# Patient Record
Sex: MALE | Race: WHITE
[De-identification: names, ages, dates, MRNs, and addresses within clinical notes are randomized per-mention and may not be internally consistent; named-entity substitution may affect disease eponyms.]

---

## 2017-09-29 ENCOUNTER — HOSPITAL ENCOUNTER (OUTPATIENT)
Dept: HOSPITAL 84 - D.CATH | Age: 52
Discharge: HOME | End: 2017-09-29
Attending: INTERNAL MEDICINE
Payer: COMMERCIAL

## 2017-09-29 VITALS — DIASTOLIC BLOOD PRESSURE: 76 MMHG | SYSTOLIC BLOOD PRESSURE: 130 MMHG

## 2017-09-29 VITALS — BODY MASS INDEX: 35.9 KG/M2

## 2017-09-29 DIAGNOSIS — Z01.812: ICD-10-CM

## 2017-09-29 DIAGNOSIS — I25.119: Primary | ICD-10-CM

## 2017-09-29 DIAGNOSIS — I10: ICD-10-CM

## 2017-09-29 LAB
ANION GAP SERPL CALC-SCNC: 14.9 MMOL/L (ref 8–16)
BASOPHILS NFR BLD AUTO: 0.2 % (ref 0–2)
BUN SERPL-MCNC: 20 MG/DL (ref 7–18)
CALCIUM SERPL-MCNC: 9.4 MG/DL (ref 8.5–10.1)
CHLORIDE SERPL-SCNC: 100 MMOL/L (ref 98–107)
CO2 SERPL-SCNC: 26.8 MMOL/L (ref 21–32)
CREAT SERPL-MCNC: 1.3 MG/DL (ref 0.6–1.3)
EOSINOPHIL NFR BLD: 1 % (ref 0–7)
ERYTHROCYTE [DISTWIDTH] IN BLOOD BY AUTOMATED COUNT: 13 % (ref 11.5–14.5)
GLUCOSE SERPL-MCNC: 170 MG/DL (ref 74–106)
HCT VFR BLD CALC: 42.3 % (ref 42–54)
HGB BLD-MCNC: 14.9 G/DL (ref 13.5–17.5)
IMM GRANULOCYTES NFR BLD: 1.4 % (ref 0–5)
LYMPHOCYTES NFR BLD AUTO: 29.8 % (ref 15–50)
MCH RBC QN AUTO: 32.3 PG (ref 26–34)
MCHC RBC AUTO-ENTMCNC: 35.2 G/DL (ref 31–37)
MCV RBC: 91.8 FL (ref 80–100)
MONOCYTES NFR BLD: 6.9 % (ref 2–11)
NEUTROPHILS NFR BLD AUTO: 60.7 % (ref 40–80)
OSMOLALITY SERPL CALC.SUM OF ELEC: 282 MOSM/KG (ref 275–300)
PLATELET # BLD: 158 10X3/UL (ref 130–400)
PMV BLD AUTO: 9.6 FL (ref 7.4–10.4)
POTASSIUM SERPL-SCNC: 3.7 MMOL/L (ref 3.5–5.1)
RBC # BLD AUTO: 4.61 10X6/UL (ref 4.2–6.1)
SODIUM SERPL-SCNC: 138 MMOL/L (ref 136–145)
WBC # BLD AUTO: 10.6 10X3/UL (ref 4.8–10.8)

## 2017-09-29 NOTE — NUR
TOLERATING FLUIDS WITH NAUSEA DENIED. VSS WITH NO DISTRESS NOTED. 6
FR EXOSEAL R/GROIN CDI NO BLEEDING NO HEMATOMA NOTED

## 2017-09-29 NOTE — NUR
R/GROIN CDI NO BLEEDING NO HEMATOMA NOTED. PIV REMOVED WITH DRESSING
APPLIED PATIENT UP TO GET DRESSED FOR DISCHARGE HOME CHEST PAIN
DENIED

## 2017-09-29 NOTE — NUR
0900 RECIEVED TO ROOM VIA STRETCHER FROM CATH LAB WITH 6 FR EXOSEAL
R/GROIN CDI NO BLEEDING NO HEMATOMA NOTED. REPORTS OF ONE STENT TO
THE LAD AND BALLON TO THE DIAG. HR 80 CHEST PAIN DENIED. O2 AT 2
LITERS NASAL WITH SAT 98% /78. INSTRUCTED PATIENT TO KEEP HEAD
FLAT ON PILLOW WITH RLE STRAIGHT

## 2017-09-29 NOTE — NUR
REPOSITIONED TO SITTING WITH HOB UP 30 DEGREES AT PATIENT
DEMAND.REFUSING TO OBEY ORDERS WANTS BP OFF AND PULSE OX OFF.
EDUCATED PATIENT OF NEED TO MONITOR VITAL SIGNS AND RISK FOR BLEEDING
TO GROIN. PATIENT STILL REFUSED HOB RAISED 30 DEGREES R/GROIN CDI NO
BLEEDING NO HEMATOMA NOTED.

## 2017-09-29 NOTE — HEMODYNAMI
PATIENT:MICK SAMS                            MEDICAL RECORD: [id number]
: 61                                            LOCATION:D.CAT          
ACCT# [id number]                                       ADMISSION DATE: 17
 
 
 Generatedon:20178:51
Patient name: MICK SAMS Patient #: [id number] Visit #: [id number] SSN: 
: 1965
Date of study: 2017
Page: Of
Hemodynamic Procedure Report
****************************
Patient Data
Patient Demographics
Procedure consent was obtained
First Name: MICK         Gender: Male
Last Name: LATRELL         : 1965
[facility] Initial: LULA        Age: 52 year(s)
Patient #: [id number]       Race: 
Visit #: [id number]
Accession #:
[id number]
Additional ID: [id number]
Contact details
Address: [address] [place] [address]  Phone: [phone]
State: [place]
City: [place]
Zip code: [address]
Past Medical History
Allergies
Allergen        Reaction        Date         Comments
Reported
Other allergy                   2015    SHRIMP (hives,
swelling); NO
allergy to Iodine.
Other allergy                   2016   Shellfish
Shellfish                       2017
Admission
Admission Data
Admission Date: 2017   Admission Time: 5:54
Procedure
Procedure Types
Cath Procedure
Diagnostic Procedure
[facility] w/Coronaries
PCI Procedure
Coronary Stent Initial
PTCA Initial
Miscellaneous Procedures
Moderate Sedation up to 30 minutes
Procedure Description
Procedure Date
Procedure Date: 2017
Procedure Start Time: 8:32
Procedure End Time: 8:50
Procedure Staff
Name                            Function
Durga Fu MD                Performing Physician
 
Yanna Miller RT               Scrub
Angel [place] RT                  Scrub
Ezio Conner RN              Nurse
Lidia Espinosa RT             Monitor
Boubacar Mancuso RN                Circulator
Procedure Data
Cath Procedure
Fluoroscopy
Diagnostic fluoroscopy      Total fluoroscopy Time: 5.6
time: 5.6 min               min
Diagnostic fluoroscopy      Total fluoroscopy dose: 764
dose: 764 mGy               mGy
Contrast Material
Contrast Material Type                       Amount (ml)
Isovue 300                                   89
Entry Location
Entry     Primary  Successful  Side  Size  Upsize Upsize Entry    Closure Succes
sful  Closure
Location                             (Fr)  1 (Fr) 2 (Fr) Remarks  Device        
      Remarks
Femoral                        Right 5 Fr  6 Fr                   Exoseal
artery                                     Short
Estimated blood loss: 10 ml
Diagnostic catheters
Device Type               Used For           End Catheter
Placement
Cordis 5Fr Pigtail        LV Angiography
Catheter (MP)
Cordis 5Fr JL 4.0         Left Coronary
Catheter (MP)             Angiography
Cordis 5Fr 3DRC Catheter  Right Coronary
(MP)                      Angiography
Procedure Complications
No complications
Procedure Medications
Medication           Administration Route Dosage
0.9% NaCl            I.V.                 100 ml/hr
Oxygen               NC                   2 l/min
Lidocaine 2%         added to field       20
Heparin Flush Bag    added to field       2 bags
(1000units/500ml NS)
Versed               I.V.                 1 mg
Fentanyl             I.V.                 50 mcg
Fentanyl             I.V.                 50 mcg
Versed               I.V.                 2 mg
Heparin Bolus        I.V.                 4000 units
Versed               I.V.                 1 mg
Hemodynamics
Rest
Heart Rate: 68 (bpm)
Snapshots
Pre Cath      Intra         NCS           Post Cath
Vital Signs
Time    Heart  Resp   SPO2 etCO2   LR5urlg NIBP (mmHg) Rhythm  Pain    Sedation
Rate   (ipm)  (%)  (mmHg)  (mmHg)                      Status  Level
(bpm)
8:10:40 66     20     98   0       0       134/69(99)  NSR     0 (11)  10(A)
, No
pain
8:15:28 68     22     97   0       0       122/67(92)  NSR     0 (11)  10(A)
 
, No
pain
8:20:13 68     15     98   0       0       120/76(90)  NSR     0 (11)  10(A)
, No
pain
8:24:58 72     16     96   0       0       120/74(92)  NSR     0 (11)  10(A)
, No
pain
8:29:42 87     19     96   0       0       121/84(104) NSR     0 (11)  10(A)
, No
pain
8:34:27 72     17     97   0       0       120/74(95)  NSR     0 (11)  9(A)
, No
pain
8:39:14 81     15     97   0       0       122/71(99)  NSR     0 (11)  9(A)
, No
pain
8:44:01 83     15     94   0       0       122/71(95)  NSR     0 (11)  10(A)
, No
pain
8:48:48 78     17     96   0       0       119/70(102) NSR     0 (11)  10(A)
, No
pain
Medications
Time    Medication       Route  Dose  Verified Delivered Reason          Notes  
Effectiveness
by       by
8:07:53 0.9% NaCl        I.V.   100   Ezio  Ezio   Per physician
ml/hr Ubaldo Conner RN       RN
8:08:12 Oxygen           NC     2     Ezio  Ezio   Per physician
l/min Ubaldo Conner
RN       RN
8:08:25 Lidocaine 2%     added  20ml  Ezio  Ezio   for local
to     vial  Ubaldo Conner   anesthetic
field        RN       RN
8:08:46 Heparin Flush    added  2     Ezio  Ezio   used for
Bag              to     bags  Ubaldo Conner   procedure
(1000units/500ml field        RN       RN
NS)
8:28:42 Versed           I.V.   1 mg  Ezio  Ezio   for sedation
Ubaldo Conner
RN       RN
8:29:07 Fentanyl         I.V.   50    Ezio  Ezio   for sedation
mcg   Ubaldo Conner
RN       RN
8:33:21 Fentanyl         I.V.   50    Ezio  Ezio   for sedation
mcg   Ubaldo Conner
RN       RN
8:33:36 Versed           I.V.   2 mg  Ezio  Ezio   for sedation
Ubaldo Conner
RN       RN
8:39:17 Heparin Bolus    I.V.   4000  Ezio  Ezio   for
units Ubaldo Conner   anticoagulation
RN       RN
8:47:37 Versed           I.V.   1 mg  Ezio  Ezio   for sedation
Ubaldo Conner RN       RN
Procedure Log
Time     Note
 
8:00:30  Boubacar Mancuso RN sent for patient. Start room use.
8:00:31  Time tracking: Regular hours
8:00:35  Plan of Care:Hemodynamics will remain stable., Cardiac
rhythm will remain stable., Comfort level will be
maintained., Respiratory function will remain
adequate., Patient/ family verbilizes understanding of
procedure., Procedure tolerated without complication.,
Recovers from procedure without complications..
8:06:06  Patient received from Pre/Post Procedure Room to CCL 1
Alert and oriented. Tansferred to table in Supine
position.
8:06:07  Warm blankets applied, and jeison hugger turned on for
patient comfort.
8:06:08  Correct patient and procedure confirmed by team.
8:06:09  Signed procedure consent form obtained from patient.
8:06:10  ECG and BP/O2 sat monitors applied to patient.
8:06:11  Full Disclosure recording started
8:07:53  0.9% NaCl 100 ml/hr I.V. was administered by Ezio Conner RN; Per physician;
8:08:12  Oxygen 2 l/min NC was administered by Ezio Conner RN; Per physician;
8:08:25  Lidocaine 2% 20ml vial added to field was administered
by Ezio Conner RN; for local anesthetic;
8:08:46  Heparin Flush Bag (1000units/500ml NS) 2 bags added to
field was administered by Ezio Conner RN; used for
procedure;
8:09:40  Vital chart was started
8:12:29  Baseline sample Acquired.
8:12:32  Rhythm: sinus rhythm
8:12:43  H&P Date Dictated: 2017 Within 30 days and on
chart., H&P Addendum completed by physician on day of
procedure. (MUST COMPLETE FOR ALL OUTPATIENTS).
8:12:44  Pre-procedure instructions explained to patient.
8:12:45  Pre-op teaching completed and patient verbalized
understanding.
8:12:47  Family in patients room.
8:12:48  Patient NPO since Midnight.
8:12:58  Patient allergic to Shellfish
8:13:01  Is the patient allergic to Iodine/contrast media? No.
8:13:03  Was the patient premedicated? No
8:13:05  Is patient on blood thinner?Yes
8:13:07  **ACC** The patient was administered the following
blood thiners within the last 24 hours:
**ACC**Aspirin, **ACC**Plavix
8:13:08  Patient diabetic? No.
8:13:16  Previous problem with sedation/anesthesia? No ?
8:13:17  Snore? Yes
8:13:18  Sleep apnea? Yes
8:13:19  Deviated septum? No
8:13:20  Opens mouth fully? Yes
8:13:21  Sticks out tongue? Yes
8:13:26  Airway obstruction? No ?
8:13:59  Dentures? No ?
8:14:02  Pre procedure: right dorsailis pedis pulse 2+ Normal;
easily identifiable; not easily obliterated
8:14:04  Patient pain scale 0/10 ?.
8:14:11  IV patent on arrival in left hand with 0.9% NaCl at
[facility].
8:14:19  Lab results completed and on chart.
8:14:21  Right groin area was prepped with chlora-prep and
 
draped in sterile fashion
8:14:22  Alarms reviewed by R. N.
8:14:23  Sharps counted by scrub and verified by R.N.
8:14:27  Use device set Femoral Dx
8:14:28  Acist Syringe opened to sterile field.
8:14:28  Bag Decanter opened to sterile field.
8:14:29  Medline Cath Pack opened to sterile field.
8:14:29  Terumo 5Fr Doyle Sheath opened to sterile field.
8:14:29  St Jaylan 260cm J .035 wire opened to sterile field.
8:14:30  Acist Hand Control opened to sterile field.
8:14:31  Acist Manifold opened to sterile field.
8:14:31  Diagnostic Infinity 5Fr Multipack catheter opened to
sterile field.
8:14:32  Tegaderm 4 x 4 opened to sterile field.
8:17:41  Physician paged
8:28:09  Final Timeout: patient, procedure, and site verified
with staff and physician. All members of the team are
in agreement.
8:28:11  Right groin site verified by team.
8:28:14  Physical assessment completed. ASA score P 2 - A
patient with mild systemic disease as per Durga Fu MD.
8:28:17  Sedation plan: IV Moderate Sedation Versed, Fentanyl
8:28:42  Versed 1 mg I.V. was administered by Ezio Conner RN; for sedation;
8:29:07  Fentanyl 50 mcg I.V. was administered by Ezio Conner RN; for sedation;
8:29:39  Zero performed for pressure channel P1
8:30:21  Zero performed for pressure channel P1
8:30:25  Procedure started.
8:32:06  Local anesthetic to right femoral artery with
Lidocaine 2% by Durga Fu MD.**INITIAL ACCESS
ONLY**
8:32:51  A 5 Fr sheath was inserted into the Right Femoral
artery
8:32:59  A Cordis 5Fr Pigtail Catheter (MP) was advanced over
the wire and used for LV Angiography.
8:33:21  Fentanyl 50 mcg I.V. was administered by Ezio Conner RN; for sedation;
8:33:30  LV gram done using RIVERA
8:33:36  Versed 2 mg I.V. was administered by Ezio Conner
RN; for sedation;
8:33:37  Injector settings: Ml/sec: 10, Volume: 20,
8:33:39  Catheter removed.
8:33:46  A Cordis 5Fr JL 4.0 Catheter (MP) was advanced over
the wire and used for Left Coronary Angiography.
8:34:52  Terumo 6Fr Doyle Sheath opened to sterile field.
8:34:52  Presley Whisper J 300cm 0.014 guide wire opened to
sterile field.
8:34:53  Merit BasixCompak Inflation Kit opened to sterile
field.
8:34:57  Catheter removed.
8:35:02  A Cordis 5Fr 3DRC Catheter (MP) was advanced over the
wire and used for Right Coronary Angiography.
8:35:35  Catheter removed.
8:35:43  Cordis 6FR XBLAD 3.5 guide catheter opened to sterile
field.
8:35:51  Sheath upsized to a 6 Fr Short.
8:38:45  6 Fr XBLAD 3.5 guide catheter was inserted over the
wire
 
8:39:17  Heparin Bolus 4000 units I.V. was administered by
Ezio Conner RN; for anticoagulation;
8:39:31  Whisper wire advanced.
8:40:57  Inflation Number: 1 A Promus Premier OTW 3.5 x 20
stent was prepped and advanced across the Mid LAD. The
stent was deployed at 17 VALENTIN for 0:05 (min:sec).
8:42:25  Wire redirected to Diag.
8:43:00  Stent catheter was removed intact over wire.
8:45:29  Inflation number: 1 A Euphora 1.5 x 12 balloon was
prepped and advanced across the 1st Diag, then
inflated to 17 VALENTIN for 0:09 (min:sec).
8:45:40  Inflation number: 2 The Euphora 1.5 x 12 balloon was
reinflated across the 1st Diag, to 21 VALENTIN for 0:06
(min:sec).
8:46:16  Balloon removed over the wire.
8:46:16  Wire removed.
8:46:17  Guide catheter removed.
8:46:24  Sheath removed intact; hemostasis achieved with
Exoseal to the Right Femoral artery.
8:46:31  Cordis 6Fr Exoseal opened to sterile field.
8:46:33  Procedure ended.(Physican Out)
8:47:37  Versed 1 mg I.V. was administered by Ezio Conner RN; for sedation;
8:47:47  Fluoroscopy time 05.60 minutes.
8:47:51  Fluoroscopy dose: 764 mGy
8:47:51  Flurop Dose total: 764
8:47:55  Contrast amount:Isovue 300 89ml.
8:47:57  Sharps counted by scrub and verified by R.N.
8:47:59  Insertion/operative site no bleeding no hematoma.
8:48:02  Post-op/insertion site Right Femoral artery dressed
using a 4 x 4 and Tegaderm.
8:48:05  Post right femoral artery:stable, clean and dry
8:48:07  Post Procedure Pulses reassessed and unchanged
8:48:10  Post-procedure physical assessment completed. ASA
score P 2 - A patient with mild systemic disease as
per Durga Fu MD.
8:48:12  Post procedure rhythm: unchanged.
8:48:18  Estimated blood loss: 10 ml
8:48:20  Post procedure instruction explained to
patient.Patient verbalizes understanding.
8:48:20  Patient needs reinforcement of post procedure
teaching.
8:48:33  Procedure type changed to Cath procedure, Diagnostic
procedure, LHC, LHC w/Coronaries, PCI procedure,
Coronary Stent Initial, PTCA Initial, Miscellaneous
Procedures, Moderate Sedation up to 30 minutes
8:49:55  Procedure and supply charges have been captured,
reviewed, submitted and are correct.
8:50:00  Procedure Complication : No complications
8:50:02  See physician's report for complete and final results.
8:50:30  Vital chart was stopped
8:50:31  Report given to Pre/Post Procedure Room.
8:50:35  Patient transfered to Pre/Post Procedure Room with
Stretcher.
8:50:42  Procedure ended.
8:50:42  Full Disclosure recording stopped
8:50:45  End room use (Document Last)
Intervention Summary
Intervention Notes
Time    ActionType  Lesion and  Equipment Action#  Pressure  Duration
 
Attributes  Used
8:40:57 Place stent Mid LAD     Promus    1        17        00:05
Premier
OTW 3.5 x
20 stent
8:45:29 Inflate     1st Diag    Euphora   1        17        00:09
balloon                 1.5 x 12
balloon
8:45:40 Reinflate   1st Diag    Euphora   2        21        00:06
balloon                 1.5 x 12
balloon
Device Usage
Item Name   Manufacture  Quantity  Catalog Number  Hospital Part    Current Mini
mal Lot# /
Charge   Number  Stock   Stock   Serial#
Code
Acist       Acist        1         [id number]           505244   014152  121596  20
[employer]
Bag         Microtek     1         S           464961   39238   597881  5
[employer]
Medline     Cardinal     1         [id number]       658509   88327   384644  5
Cath Pack   Health
Terumo 5Fr  Terumo       1         [id number]          101910   978263  401004  40
Doyle
Sheath
St Jaylan     St Jaylan      1         [id number]          078605   841053  997741  30
260cm J
.035 wire
Acist Hand  Acist        1         48239           497892   286084  059154  5
Asthmatx     Medical
Systems Inc
Acist       Acist        1         92435           104342   639487  172399  5
Yohobuy    Medical
Systems Inc
Diagnostic  Cardinal     1         [id number]          141581   13251   964513  30
Infinity    Health
5Fr
Multipack
catheter
Tegaderm 4  3M           1         [id number]           851455   244300  255206  5
x 4
Cordis 5Fr  Cardinal     1                                          241008  5
Pigtail     Health
Catheter
(MP)
Cordis 5Fr  Cardinal     1                                          099917  5
JL 4.0      Health
Catheter
(MP)
Terumo 6Fr  Terumo       1         [id number]          487865   036497  148633  40
Doyle
Sheath
[facility]       1         [id number]       809367   386089  720331  5
Whisper J   Vascular
300cm 0.014
guide wire
Merit       Merit        1         [id number]          445576   897084  819903  15
[employer] Medical
Inflation
 
Kit
Cordis 5Fr  Cardinal     1                                          [id number]  5
3DRC        Health
Catheter
(MP)
Cordis 6FR  Cardinal     1         [id number]        893626   206264  965618  10
XBLAD 3.5   Health
guide
catheter
Promus      Stokes       1         [id number]  513858           852814  5   
    [id number]
Premier [employer]
3.5 x 20
stent
Euphora 1.5 Medtronic    1         [id number]        703031   913621  507957  5   
    [id number]
x 12
balloon
Cordis 6Fr  Cardinal     1                    013001   824114  913397  10
[employer]Vox     Health
Signature Audit [place]
Stage           Time        Signature      Unsigned
Intra-Procedure 2017   Lidia
8:50:57 AM  Counts RT(R)
Signatures
Monitor : Lidia     Signature :
Counts RT               ______________________________
Date : ______________ Time :
______________
 
 
 
 
 
 
 
 
 
 
 
 
 
 
 
 
 
 
 
 
 
 
 
 
 
 
[facility]                                          
[address] [place] [address]

## 2017-09-29 NOTE — NUR
VERBAL AND WRITTEN DISCHARGE GONE OVER WITH PATIENT AND FRIENDS.
R/GROIN REMAINS CDI NO BLEEDING NO HEMATOMA NOTED. CHEST PAIN IS
DENIED. LEFT VIA WC TO PARKING FOR TRANSPORT HOME NO DISRESS

## 2017-09-29 NOTE — OP
PATIENT NAME:  MICK SAMS                     MEDICAL RECORD: [id number]
:[birth year]                                             LOCATION:D.CAT          
                                                         ADMISSION DATE:        
SURGEON:  SORIN COUCH MD             
 
 
DATE OF OPERATION:  2017
 
PROCEDURES:
1.  PTCA stent LAD.
2.  PTCA LAD diagonal.
3.  Left heart catheterization.
4.  Selective coronary angiography.
5.  Left ventriculogram.
 
INDICATION:  Angina and coronary artery disease.
 
PROCEDURE IN DETAIL:  After informed consent was obtained and after a detailed
explanation of the risks, benefits as well as alternative therapies, the patient
elected to proceed with angiogram and angioplasty.  The right femoral area was
prepped and draped in normal sterile fashion.  The right femoral artery was
cannulated via modified Seldinger technique with placement of a 6-Botswanan sheath.
 All catheters exchanged through this sheath.
 
FINDINGS:  Left ventriculogram was performed in standard 30-degree RIVERA view,
reveals good cardiac wall motion throughout all segments.  Overall ejection
fraction estimated 60%.
 
SELECTIVE CORONARY ANGIOGRAPHY:
1.  Left main showed no significant angiographic disease.
2.  Left anterior descending has previously placed stent in the mid vessel with
up to 70% to 80% in-stent restenosis.  This is a Resolute stent placed in 2015.
3.  Left circumflex shows moderate irregularities, but no flow-limiting
stenosis.  Previously placed stent is widely patent.
4.  Right coronary has moderate irregularities, but no flow-limiting stenosis.
 
PTCA STENT OF THE LAD:  The in-stent restenosis was addressed with a 3.5 x 20 mm
Promus stent taken to 17 atmospheres.  The diagonal have a plaque shift opposing
impingement and the diagonal, this was ballooned with a 1.5 balloon.  Result was
0% residual throughout.
 
OVERALL IMPRESSION:  Successful percutaneous transluminal coronary angioplasty
stent of the left anterior descending going from 70% to 80% in-stent restenosis
to 0% residual stenosis.
 
TRANSINT:[id number] Voice Confirmation ID: [id number] DOCUMENT ID: [id number]
                                           
                                           SORIN COUCH MD             
 
 
 
Electronically Signed by SORIN COUCH on 17 at 1647
CC:                                                             [id number]
DICTATION DATE: 17 0851     :     17 1244      DEP CLI 
                                                                      [address]
[place] [address]

## 2017-09-29 NOTE — NUR
6 FR EXOSEAL R/GROIN CDI NO BLEEDING NO HEMATOMA NOTED. VSS WITH
CHEST PAIN DENIED. PATIENT VERBALIZED NEED TO URINATE URINAL PROVIDED
WITH PATIENT UPSET. STATED I HAVE TO STAND UP TO URINATE I INSTRUCTED
PATIENT OF  ORDERS FOR BED REST UNTIL 1300. PATIENT ANGRY STATED
THEN I WANT TO GO HOME NOW. NOTIFIED CRISTIANE WITH ASSIST TO ROOM

## 2017-09-29 NOTE — NUR
6 FR EXOSEAL R/GROIN CDI NO BLEEDING NO HEMATOMA NOTED. VSS WITH
CHEST PAIN DENIED. DR COUCH AT BEDSIDE

## 2017-09-29 NOTE — NUR
6 FR EXOSEAL R/GROIN CDI NO BLEEDING NO HEMATOMA NOTED. PATIENT VOIDS
700 CC URINE TO COLLECTION VSS

## 2018-03-07 ENCOUNTER — HOSPITAL ENCOUNTER (OUTPATIENT)
Dept: HOSPITAL 84 - D.CATH | Age: 53
Discharge: HOME | End: 2018-03-07
Attending: INTERNAL MEDICINE
Payer: COMMERCIAL

## 2018-03-07 VITALS — DIASTOLIC BLOOD PRESSURE: 73 MMHG | SYSTOLIC BLOOD PRESSURE: 120 MMHG

## 2018-03-07 VITALS
BODY MASS INDEX: 34 KG/M2 | HEIGHT: 70 IN | HEIGHT: 70 IN | BODY MASS INDEX: 34 KG/M2 | WEIGHT: 237.5 LBS | WEIGHT: 237.5 LBS

## 2018-03-07 DIAGNOSIS — I25.119: Primary | ICD-10-CM

## 2018-03-07 DIAGNOSIS — I10: ICD-10-CM

## 2018-03-07 DIAGNOSIS — Z01.812: ICD-10-CM

## 2018-03-07 LAB
ANION GAP SERPL CALC-SCNC: 15.4 MMOL/L (ref 8–16)
BASOPHILS NFR BLD AUTO: 0.5 % (ref 0–2)
BUN SERPL-MCNC: 17 MG/DL (ref 7–18)
CALCIUM SERPL-MCNC: 9.5 MG/DL (ref 8.5–10.1)
CHLORIDE SERPL-SCNC: 100 MMOL/L (ref 98–107)
CO2 SERPL-SCNC: 24.5 MMOL/L (ref 21–32)
CREAT SERPL-MCNC: 1.2 MG/DL (ref 0.6–1.3)
EOSINOPHIL NFR BLD: 3.8 % (ref 0–7)
ERYTHROCYTE [DISTWIDTH] IN BLOOD BY AUTOMATED COUNT: 12.8 % (ref 11.5–14.5)
GLUCOSE SERPL-MCNC: 298 MG/DL (ref 74–106)
HCT VFR BLD CALC: 42.8 % (ref 42–54)
HGB BLD-MCNC: 15.1 G/DL (ref 13.5–17.5)
IMM GRANULOCYTES NFR BLD: 0.5 % (ref 0–5)
LYMPHOCYTES NFR BLD AUTO: 30.9 % (ref 15–50)
MCH RBC QN AUTO: 32 PG (ref 26–34)
MCHC RBC AUTO-ENTMCNC: 35.3 G/DL (ref 31–37)
MCV RBC: 90.7 FL (ref 80–100)
MONOCYTES NFR BLD: 6.5 % (ref 2–11)
NEUTROPHILS NFR BLD AUTO: 57.8 % (ref 40–80)
OSMOLALITY SERPL CALC.SUM OF ELEC: 284 MOSM/KG (ref 275–300)
PLATELET # BLD: 123 10X3/UL (ref 130–400)
PMV BLD AUTO: 9.9 FL (ref 7.4–10.4)
POTASSIUM SERPL-SCNC: 3.9 MMOL/L (ref 3.5–5.1)
RBC # BLD AUTO: 4.72 10X6/UL (ref 4.2–6.1)
SODIUM SERPL-SCNC: 136 MMOL/L (ref 136–145)
WBC # BLD AUTO: 5.5 10X3/UL (ref 4.8–10.8)

## 2018-05-25 ENCOUNTER — HOSPITAL ENCOUNTER (OUTPATIENT)
Dept: HOSPITAL 84 - D.CATH | Age: 53
Discharge: HOME | End: 2018-05-25
Attending: INTERNAL MEDICINE
Payer: COMMERCIAL

## 2018-05-25 VITALS — SYSTOLIC BLOOD PRESSURE: 110 MMHG | DIASTOLIC BLOOD PRESSURE: 75 MMHG

## 2018-05-25 VITALS — HEIGHT: 70 IN | BODY MASS INDEX: 33.86 KG/M2 | WEIGHT: 236.5 LBS

## 2018-05-25 DIAGNOSIS — I25.119: Primary | ICD-10-CM

## 2018-05-25 DIAGNOSIS — Z01.812: ICD-10-CM

## 2018-05-25 LAB
ANION GAP SERPL CALC-SCNC: 12.3 MMOL/L (ref 8–16)
BASOPHILS NFR BLD AUTO: 0.4 % (ref 0–2)
BUN SERPL-MCNC: 14 MG/DL (ref 7–18)
CALCIUM SERPL-MCNC: 9.6 MG/DL (ref 8.5–10.1)
CHLORIDE SERPL-SCNC: 103 MMOL/L (ref 98–107)
CO2 SERPL-SCNC: 27.9 MMOL/L (ref 21–32)
CREAT SERPL-MCNC: 1.2 MG/DL (ref 0.6–1.3)
EOSINOPHIL NFR BLD: 4.4 % (ref 0–7)
ERYTHROCYTE [DISTWIDTH] IN BLOOD BY AUTOMATED COUNT: 13.1 % (ref 11.5–14.5)
GLUCOSE SERPL-MCNC: 274 MG/DL (ref 74–106)
HCT VFR BLD CALC: 41.6 % (ref 42–54)
HGB BLD-MCNC: 14.6 G/DL (ref 13.5–17.5)
IMM GRANULOCYTES NFR BLD: 0.4 % (ref 0–5)
LYMPHOCYTES NFR BLD AUTO: 27.9 % (ref 15–50)
MCH RBC QN AUTO: 32.4 PG (ref 26–34)
MCHC RBC AUTO-ENTMCNC: 35.1 G/DL (ref 31–37)
MCV RBC: 92.4 FL (ref 80–100)
MONOCYTES NFR BLD: 7.2 % (ref 2–11)
NEUTROPHILS NFR BLD AUTO: 59.7 % (ref 40–80)
OSMOLALITY SERPL CALC.SUM OF ELEC: 288 MOSM/KG (ref 275–300)
PLATELET # BLD: 101 10X3/UL (ref 130–400)
PMV BLD AUTO: 9.6 FL (ref 7.4–10.4)
POTASSIUM SERPL-SCNC: 4.2 MMOL/L (ref 3.5–5.1)
RBC # BLD AUTO: 4.5 10X6/UL (ref 4.2–6.1)
SODIUM SERPL-SCNC: 139 MMOL/L (ref 136–145)
WBC # BLD AUTO: 4.7 10X3/UL (ref 4.8–10.8)

## 2018-05-26 ENCOUNTER — HOSPITAL ENCOUNTER (OUTPATIENT)
Dept: HOSPITAL 84 - D.ER | Age: 53
Setting detail: OBSERVATION
LOS: 1 days | Discharge: HOME | End: 2018-05-27
Attending: INTERNAL MEDICINE | Admitting: INTERNAL MEDICINE
Payer: COMMERCIAL

## 2018-05-26 VITALS — SYSTOLIC BLOOD PRESSURE: 128 MMHG | DIASTOLIC BLOOD PRESSURE: 74 MMHG

## 2018-05-26 VITALS — BODY MASS INDEX: 35.03 KG/M2 | BODY MASS INDEX: 35.03 KG/M2 | HEIGHT: 69 IN | WEIGHT: 236.5 LBS

## 2018-05-26 DIAGNOSIS — I25.10: ICD-10-CM

## 2018-05-26 DIAGNOSIS — R07.9: Primary | ICD-10-CM

## 2018-05-26 DIAGNOSIS — Z95.5: ICD-10-CM

## 2018-05-26 DIAGNOSIS — I10: ICD-10-CM

## 2018-05-26 LAB
ALBUMIN SERPL-MCNC: 4 G/DL (ref 3.4–5)
ALP SERPL-CCNC: 75 U/L (ref 46–116)
ALT SERPL-CCNC: 86 U/L (ref 10–68)
ANION GAP SERPL CALC-SCNC: 14.6 MMOL/L (ref 8–16)
BASOPHILS NFR BLD AUTO: 0.1 % (ref 0–2)
BILIRUB SERPL-MCNC: 0.85 MG/DL (ref 0.2–1.3)
BUN SERPL-MCNC: 26 MG/DL (ref 7–18)
CALCIUM SERPL-MCNC: 9.5 MG/DL (ref 8.5–10.1)
CHLORIDE SERPL-SCNC: 99 MMOL/L (ref 98–107)
CHOLEST/HDLC SERPL: 9.8 RATIO (ref 2.3–4.9)
CK MB SERPL-MCNC: 0.3 U/L (ref 0–3.6)
CK MB SERPL-MCNC: 0.4 U/L (ref 0–3.6)
CK SERPL-CCNC: 139 UL (ref 21–232)
CK SERPL-CCNC: 152 UL (ref 21–232)
CO2 SERPL-SCNC: 25.4 MMOL/L (ref 21–32)
CREAT SERPL-MCNC: 1.4 MG/DL (ref 0.6–1.3)
EOSINOPHIL NFR BLD: 0 % (ref 0–7)
ERYTHROCYTE [DISTWIDTH] IN BLOOD BY AUTOMATED COUNT: 13.2 % (ref 11.5–14.5)
GLOBULIN SER-MCNC: 3.9 G/L
GLUCOSE SERPL-MCNC: 336 MG/DL (ref 74–106)
HCT VFR BLD CALC: 40.2 % (ref 42–54)
HDLC SERPL-MCNC: 28 MG/DL (ref 32–96)
HGB BLD-MCNC: 14.2 G/DL (ref 13.5–17.5)
IMM GRANULOCYTES NFR BLD: 0.3 % (ref 0–5)
LDL-HDL RATIO: 7.3 RATIO (ref 1.5–3.5)
LDLC SERPL-MCNC: 205 MG/DL (ref 0–100)
LYMPHOCYTES NFR BLD AUTO: 11.1 % (ref 15–50)
MCH RBC QN AUTO: 32.5 PG (ref 26–34)
MCHC RBC AUTO-ENTMCNC: 35.3 G/DL (ref 31–37)
MCV RBC: 92 FL (ref 80–100)
MONOCYTES NFR BLD: 7.8 % (ref 2–11)
NEUTROPHILS NFR BLD AUTO: 80.7 % (ref 40–80)
OSMOLALITY SERPL CALC.SUM OF ELEC: 287 MOSM/KG (ref 275–300)
PLATELET # BLD: 134 10X3/UL (ref 130–400)
PMV BLD AUTO: 9.7 FL (ref 7.4–10.4)
POTASSIUM SERPL-SCNC: 4 MMOL/L (ref 3.5–5.1)
PROT SERPL-MCNC: 7.9 G/DL (ref 6.4–8.2)
RBC # BLD AUTO: 4.37 10X6/UL (ref 4.2–6.1)
SODIUM SERPL-SCNC: 135 MMOL/L (ref 136–145)
TRIGL SERPL-MCNC: 212 MG/DL (ref 30–200)
TROPONIN I SERPL-MCNC: < 0.017 NG/ML (ref 0–0.06)
WBC # BLD AUTO: 13.1 10X3/UL (ref 4.8–10.8)

## 2018-05-27 VITALS — SYSTOLIC BLOOD PRESSURE: 108 MMHG | DIASTOLIC BLOOD PRESSURE: 60 MMHG

## 2018-05-27 VITALS — SYSTOLIC BLOOD PRESSURE: 108 MMHG | DIASTOLIC BLOOD PRESSURE: 63 MMHG

## 2018-05-27 LAB
CK MB SERPL-MCNC: 0.2 U/L (ref 0–3.6)
CK MB SERPL-MCNC: 0.2 U/L (ref 0–3.6)
CK SERPL-CCNC: 91 UL (ref 21–232)
CK SERPL-CCNC: 93 UL (ref 21–232)
TROPONIN I SERPL-MCNC: < 0.017 NG/ML (ref 0–0.06)

## 2018-06-19 ENCOUNTER — HOSPITAL ENCOUNTER (OUTPATIENT)
Dept: HOSPITAL 84 - D.LAB | Age: 53
Discharge: HOME | End: 2018-06-19
Attending: THORACIC SURGERY (CARDIOTHORACIC VASCULAR SURGERY)
Payer: COMMERCIAL

## 2018-06-19 VITALS — BODY MASS INDEX: 34.9 KG/M2

## 2018-06-19 DIAGNOSIS — D69.6: Primary | ICD-10-CM

## 2018-06-19 LAB — PA ADP PRP-ACNC: 183 PRU (ref 194–418)
